# Patient Record
Sex: MALE | Race: WHITE | Employment: OTHER | URBAN - METROPOLITAN AREA
[De-identification: names, ages, dates, MRNs, and addresses within clinical notes are randomized per-mention and may not be internally consistent; named-entity substitution may affect disease eponyms.]

---

## 2017-12-06 ENCOUNTER — HOSPITAL ENCOUNTER (OUTPATIENT)
Age: 62
Discharge: HOME | End: 2017-12-06
Payer: COMMERCIAL

## 2017-12-06 LAB
ALBUMIN SERPL-MCNC: 4.1 G/DL (ref 3.5–5.2)
ALBUMIN/GLOBULIN RATIO: NORMAL (ref 1–2.5)
ALP BLD-CCNC: 63 U/L (ref 40–129)
ALT SERPL-CCNC: 27 U/L (ref 5–41)
ANION GAP SERPL CALCULATED.3IONS-SCNC: 11 MMOL/L (ref 9–17)
AST SERPL-CCNC: 34 U/L
BILIRUB SERPL-MCNC: 0.38 MG/DL (ref 0.3–1.2)
BILIRUBIN DIRECT: 0.11 MG/DL
BILIRUBIN, INDIRECT: 0.27 MG/DL (ref 0–1)
BUN BLDV-MCNC: 39 MG/DL (ref 8–23)
BUN/CREAT BLD: ABNORMAL (ref 9–20)
CALCIUM SERPL-MCNC: 9.1 MG/DL (ref 8.6–10.4)
CHLORIDE BLD-SCNC: 101 MMOL/L (ref 98–107)
CHOLESTEROL/HDL RATIO: 3.6
CHOLESTEROL: 192 MG/DL
CO2: 28 MMOL/L (ref 20–31)
CREAT SERPL-MCNC: 1.27 MG/DL (ref 0.7–1.2)
GFR AFRICAN AMERICAN: >60 ML/MIN
GFR NON-AFRICAN AMERICAN: 57 ML/MIN
GFR SERPL CREATININE-BSD FRML MDRD: ABNORMAL ML/MIN/{1.73_M2}
GFR SERPL CREATININE-BSD FRML MDRD: ABNORMAL ML/MIN/{1.73_M2}
GLOBULIN: NORMAL G/DL (ref 1.5–3.8)
GLUCOSE BLD-MCNC: 102 MG/DL (ref 70–99)
HCT VFR BLD CALC: 42.3 % (ref 41–53)
HDLC SERPL-MCNC: 53 MG/DL
HEMOGLOBIN: 14 G/DL (ref 13.5–17.5)
LDL CHOLESTEROL: 126 MG/DL (ref 0–130)
MCH RBC QN AUTO: 30.3 PG (ref 26–34)
MCHC RBC AUTO-ENTMCNC: 33.2 G/DL (ref 31–37)
MCV RBC AUTO: 91.3 FL (ref 80–100)
PDW BLD-RTO: 14.9 % (ref 11.5–14.9)
PLATELET # BLD: 180 K/UL (ref 150–450)
PMV BLD AUTO: 9.1 FL (ref 6–12)
POTASSIUM SERPL-SCNC: 4.6 MMOL/L (ref 3.7–5.3)
PROSTATE SPECIFIC ANTIGEN: 0.48 UG/L
RBC # BLD: 4.63 M/UL (ref 4.5–5.9)
SODIUM BLD-SCNC: 140 MMOL/L (ref 135–144)
TOTAL PROTEIN: 6.8 G/DL (ref 6.4–8.3)
TRIGL SERPL-MCNC: 67 MG/DL
URIC ACID: 5.1 MG/DL (ref 3.4–7)
VLDLC SERPL CALC-MCNC: NORMAL MG/DL (ref 1–30)
WBC # BLD: 5.5 K/UL (ref 3.5–11)

## 2017-12-06 PROCEDURE — 85027 COMPLETE CBC AUTOMATED: CPT

## 2017-12-06 PROCEDURE — 84550 ASSAY OF BLOOD/URIC ACID: CPT

## 2017-12-06 PROCEDURE — G0103 PSA SCREENING: HCPCS

## 2017-12-06 PROCEDURE — 80061 LIPID PANEL: CPT

## 2017-12-06 PROCEDURE — 80076 HEPATIC FUNCTION PANEL: CPT

## 2017-12-06 PROCEDURE — 36415 COLL VENOUS BLD VENIPUNCTURE: CPT

## 2017-12-06 PROCEDURE — 80048 BASIC METABOLIC PNL TOTAL CA: CPT

## 2018-01-21 ENCOUNTER — OFFICE VISIT (OUTPATIENT)
Dept: NEUROLOGY | Age: 63
End: 2018-01-21
Payer: COMMERCIAL

## 2018-01-21 DIAGNOSIS — G60.9 PERIPHERAL NEUROPATHY, IDIOPATHIC: Primary | ICD-10-CM

## 2018-01-21 PROCEDURE — 95909 NRV CNDJ TST 5-6 STUDIES: CPT | Performed by: PSYCHIATRY & NEUROLOGY

## 2018-01-21 PROCEDURE — 95886 MUSC TEST DONE W/N TEST COMP: CPT | Performed by: PSYCHIATRY & NEUROLOGY

## 2018-01-21 NOTE — PROGRESS NOTES
[facility] by [address] [place] [address], [address] [place] [address]    [phone] phone  [phone] fax          Name: Douglas Tellez Patient ID: [id number]   Gender: Male Date of Exam: 1/21/2018   Age: 57 y YOB: 1955   Height: 5'11\" Weight: 26 Lbs   Referring Physician: Kirti Rodriguez MD   Examining Physician: Chyna Love MD        Patient History:   The patient has been complaining of numbness in bilateral feet, radiating up to shins. bulging disc in lumbar spine. having the symptoms since 2011, gradually getting worse. Neurological examination is normal    Bilateral peroneal motor studies showed normal DML, low CMAP amplitudes and low normal motor nerve conduction velocities with prolonged F-wave latencies. Bilateral tibial motor studies showed normal DMLs, low CMAP amplitudes and borderline normal motor nerve conduction velocities. Right sural antidromic sensory response showed normal peak latency, amplitude and sensory nerve conduction velocity. 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   Active denervation was seen in right lower paraspinal muscles        Motor Nerve Conduction:    Nerve and Site Latency Amplitude Segment Latency  Difference Distance Conduction  Velocity            Peroneal.R         Ankle 5.8 ms 1.9 mV Extensor digitorum brevis-Ankle 5.8 ms 80 mm  m/s   Fibula (head) 14.2 ms 1.6 mV Ankle-Fibula (head) 8.4 ms 340 mm 40 m/s   Popliteal fossa 16.4 ms 1.3 mV Fibula (head)-Popliteal fossa 2.2 ms 90 mm 41 m/s   Tibial.R         Ankle 6.7 ms 1.2 mV Abductor hallucis-Ankle 6.7 ms 80 mm  m/s   Popliteal fossa 15.6 ms 1.4 mV Ankle-Popliteal fossa 8.9 ms 405 mm 46 m/s   Peroneal.L         Ankle 5.5 ms 3.3 mV Extensor digitorum brevis-Ankle

## 2018-02-14 ENCOUNTER — TELEPHONE (OUTPATIENT)
Dept: NEUROLOGY | Age: 63
End: 2018-02-14

## 2018-04-11 ENCOUNTER — OFFICE VISIT (OUTPATIENT)
Dept: PODIATRY | Age: 63
End: 2018-04-11
Payer: COMMERCIAL

## 2018-04-11 VITALS
WEIGHT: 230 LBS | DIASTOLIC BLOOD PRESSURE: 70 MMHG | HEIGHT: 72 IN | SYSTOLIC BLOOD PRESSURE: 121 MMHG | BODY MASS INDEX: 31.15 KG/M2 | HEART RATE: 60 BPM

## 2018-04-11 DIAGNOSIS — M79.675 PAIN IN TOE OF LEFT FOOT: ICD-10-CM

## 2018-04-11 DIAGNOSIS — L97.522 SKIN ULCER OF LEFT GREAT TOE WITH FAT LAYER EXPOSED (HCC): Primary | ICD-10-CM

## 2018-04-11 PROCEDURE — 1036F TOBACCO NON-USER: CPT | Performed by: PODIATRIST

## 2018-04-11 PROCEDURE — 99203 OFFICE O/P NEW LOW 30 MIN: CPT | Performed by: PODIATRIST

## 2018-04-11 PROCEDURE — 3017F COLORECTAL CA SCREEN DOC REV: CPT | Performed by: PODIATRIST

## 2018-04-11 PROCEDURE — 11042 DBRDMT SUBQ TIS 1ST 20SQCM/<: CPT | Performed by: PODIATRIST

## 2018-04-11 PROCEDURE — G8427 DOCREV CUR MEDS BY ELIG CLIN: HCPCS | Performed by: PODIATRIST

## 2018-04-11 PROCEDURE — G8417 CALC BMI ABV UP PARAM F/U: HCPCS | Performed by: PODIATRIST

## 2018-04-11 RX ORDER — LISINOPRIL 10 MG/1
10 TABLET ORAL DAILY
COMMUNITY

## 2018-04-11 RX ORDER — PREGABALIN 300 MG/1
300 CAPSULE ORAL 2 TIMES DAILY
COMMUNITY

## 2018-04-11 RX ORDER — MIRTAZAPINE 15 MG/1
15 TABLET, FILM COATED ORAL NIGHTLY
COMMUNITY
End: 2018-05-11

## 2018-04-11 RX ORDER — ALLOPURINOL 300 MG/1
300 TABLET ORAL DAILY
COMMUNITY

## 2018-04-11 RX ORDER — CEPHALEXIN 500 MG/1
500 CAPSULE ORAL 4 TIMES DAILY
Qty: 40 CAPSULE | Refills: 0 | Status: SHIPPED | OUTPATIENT
Start: 2018-04-11 | End: 2018-04-21

## 2018-04-11 RX ORDER — TERBINAFINE HYDROCHLORIDE 250 MG/1
250 TABLET ORAL DAILY
COMMUNITY
End: 2018-07-02

## 2018-04-11 RX ORDER — DIAZEPAM 2 MG/1
2 TABLET ORAL EVERY 6 HOURS PRN
COMMUNITY
End: 2019-09-27 | Stop reason: ALTCHOICE

## 2018-04-11 RX ORDER — BISOPROLOL FUMARATE AND HYDROCHLOROTHIAZIDE 10; 6.25 MG/1; MG/1
1 TABLET ORAL DAILY
COMMUNITY

## 2018-04-11 RX ORDER — OXYCODONE HYDROCHLORIDE 10 MG/1
10 TABLET ORAL EVERY 6 HOURS PRN
COMMUNITY

## 2018-04-11 RX ORDER — DULOXETIN HYDROCHLORIDE 60 MG/1
60 CAPSULE, DELAYED RELEASE ORAL 2 TIMES DAILY
COMMUNITY
End: 2019-09-27 | Stop reason: ALTCHOICE

## 2018-04-11 RX ORDER — POLYETHYLENE GLYCOL 3350 17 G/17G
17 POWDER, FOR SOLUTION ORAL DAILY PRN
COMMUNITY
End: 2019-11-22 | Stop reason: ALTCHOICE

## 2018-04-11 ASSESSMENT — ENCOUNTER SYMPTOMS
SHORTNESS OF BREATH: 0
NAUSEA: 0
DIARRHEA: 0
BACK PAIN: 1
COLOR CHANGE: 0

## 2018-04-17 ENCOUNTER — OFFICE VISIT (OUTPATIENT)
Dept: PODIATRY | Age: 63
End: 2018-04-17
Payer: COMMERCIAL

## 2018-04-17 VITALS
HEIGHT: 72 IN | DIASTOLIC BLOOD PRESSURE: 70 MMHG | HEART RATE: 60 BPM | SYSTOLIC BLOOD PRESSURE: 112 MMHG | BODY MASS INDEX: 31.15 KG/M2 | WEIGHT: 230 LBS

## 2018-04-17 DIAGNOSIS — M79.675 PAIN IN TOE OF LEFT FOOT: ICD-10-CM

## 2018-04-17 DIAGNOSIS — L97.522 SKIN ULCER OF LEFT GREAT TOE WITH FAT LAYER EXPOSED (HCC): Primary | ICD-10-CM

## 2018-04-17 PROCEDURE — 11042 DBRDMT SUBQ TIS 1ST 20SQCM/<: CPT | Performed by: PODIATRIST

## 2018-04-22 ASSESSMENT — ENCOUNTER SYMPTOMS
BACK PAIN: 1
COLOR CHANGE: 0
DIARRHEA: 0
SHORTNESS OF BREATH: 0
NAUSEA: 0

## 2018-04-27 ENCOUNTER — OFFICE VISIT (OUTPATIENT)
Dept: PODIATRY | Age: 63
End: 2018-04-27
Payer: COMMERCIAL

## 2018-04-27 VITALS
HEIGHT: 72 IN | HEART RATE: 60 BPM | WEIGHT: 230 LBS | SYSTOLIC BLOOD PRESSURE: 114 MMHG | BODY MASS INDEX: 31.15 KG/M2 | DIASTOLIC BLOOD PRESSURE: 75 MMHG

## 2018-04-27 DIAGNOSIS — L97.522 SKIN ULCER OF LEFT GREAT TOE WITH FAT LAYER EXPOSED (HCC): Primary | ICD-10-CM

## 2018-04-27 DIAGNOSIS — M79.675 PAIN IN TOE OF LEFT FOOT: ICD-10-CM

## 2018-04-27 PROCEDURE — 11042 DBRDMT SUBQ TIS 1ST 20SQCM/<: CPT | Performed by: PODIATRIST

## 2018-04-30 ASSESSMENT — ENCOUNTER SYMPTOMS
NAUSEA: 0
DIARRHEA: 0
SHORTNESS OF BREATH: 0
BACK PAIN: 1
COLOR CHANGE: 0

## 2018-05-04 ENCOUNTER — OFFICE VISIT (OUTPATIENT)
Dept: PODIATRY | Age: 63
End: 2018-05-04
Payer: COMMERCIAL

## 2018-05-04 VITALS
HEART RATE: 58 BPM | BODY MASS INDEX: 31.15 KG/M2 | HEIGHT: 72 IN | DIASTOLIC BLOOD PRESSURE: 69 MMHG | SYSTOLIC BLOOD PRESSURE: 112 MMHG | WEIGHT: 230 LBS

## 2018-05-04 DIAGNOSIS — L97.522 SKIN ULCER OF LEFT GREAT TOE WITH FAT LAYER EXPOSED (HCC): Primary | ICD-10-CM

## 2018-05-04 DIAGNOSIS — M79.675 PAIN IN TOE OF LEFT FOOT: ICD-10-CM

## 2018-05-04 PROCEDURE — 11042 DBRDMT SUBQ TIS 1ST 20SQCM/<: CPT | Performed by: PODIATRIST

## 2018-05-08 ASSESSMENT — ENCOUNTER SYMPTOMS
SHORTNESS OF BREATH: 0
DIARRHEA: 0
COLOR CHANGE: 0
BACK PAIN: 1
NAUSEA: 0

## 2018-05-11 ENCOUNTER — OFFICE VISIT (OUTPATIENT)
Dept: PODIATRY | Age: 63
End: 2018-05-11
Payer: COMMERCIAL

## 2018-05-11 VITALS
WEIGHT: 230 LBS | HEART RATE: 66 BPM | BODY MASS INDEX: 31.15 KG/M2 | SYSTOLIC BLOOD PRESSURE: 109 MMHG | HEIGHT: 72 IN | DIASTOLIC BLOOD PRESSURE: 64 MMHG

## 2018-05-11 DIAGNOSIS — L97.522 SKIN ULCER OF LEFT GREAT TOE WITH FAT LAYER EXPOSED (HCC): ICD-10-CM

## 2018-05-11 DIAGNOSIS — M79.675 PAIN IN TOE OF LEFT FOOT: ICD-10-CM

## 2018-05-11 PROCEDURE — 99999 PR OFFICE/OUTPT VISIT,PROCEDURE ONLY: CPT | Performed by: PODIATRIST

## 2018-05-11 PROCEDURE — 11042 DBRDMT SUBQ TIS 1ST 20SQCM/<: CPT | Performed by: PODIATRIST

## 2018-05-14 ENCOUNTER — TELEPHONE (OUTPATIENT)
Dept: PODIATRY | Age: 63
End: 2018-05-14

## 2018-05-15 ASSESSMENT — ENCOUNTER SYMPTOMS
NAUSEA: 0
COLOR CHANGE: 0
SHORTNESS OF BREATH: 0
BACK PAIN: 1
DIARRHEA: 0

## 2018-05-18 ENCOUNTER — OFFICE VISIT (OUTPATIENT)
Dept: PODIATRY | Age: 63
End: 2018-05-18
Payer: COMMERCIAL

## 2018-05-18 VITALS
WEIGHT: 230 LBS | HEART RATE: 58 BPM | DIASTOLIC BLOOD PRESSURE: 73 MMHG | BODY MASS INDEX: 31.15 KG/M2 | HEIGHT: 72 IN | SYSTOLIC BLOOD PRESSURE: 127 MMHG

## 2018-05-18 DIAGNOSIS — M79.675 PAIN IN TOE OF LEFT FOOT: ICD-10-CM

## 2018-05-18 DIAGNOSIS — L97.522 SKIN ULCER OF LEFT GREAT TOE WITH FAT LAYER EXPOSED (HCC): Primary | ICD-10-CM

## 2018-05-18 PROCEDURE — 11042 DBRDMT SUBQ TIS 1ST 20SQCM/<: CPT | Performed by: PODIATRIST

## 2018-05-21 ASSESSMENT — ENCOUNTER SYMPTOMS
COLOR CHANGE: 0
DIARRHEA: 0
SHORTNESS OF BREATH: 0
NAUSEA: 0
BACK PAIN: 1

## 2018-06-01 ENCOUNTER — HOSPITAL ENCOUNTER (OUTPATIENT)
Age: 63
Setting detail: SPECIMEN
Discharge: HOME | End: 2018-06-01
Payer: COMMERCIAL

## 2018-06-01 ENCOUNTER — OFFICE VISIT (OUTPATIENT)
Dept: PODIATRY | Age: 63
End: 2018-06-01
Payer: COMMERCIAL

## 2018-06-01 VITALS
SYSTOLIC BLOOD PRESSURE: 108 MMHG | DIASTOLIC BLOOD PRESSURE: 64 MMHG | HEART RATE: 79 BPM | BODY MASS INDEX: 31.15 KG/M2 | HEIGHT: 72 IN | WEIGHT: 230 LBS

## 2018-06-01 DIAGNOSIS — L97.522 SKIN ULCER OF LEFT GREAT TOE WITH FAT LAYER EXPOSED (HCC): Primary | ICD-10-CM

## 2018-06-01 DIAGNOSIS — M79.675 PAIN IN TOE OF LEFT FOOT: ICD-10-CM

## 2018-06-01 DIAGNOSIS — M79.674 PAIN IN TOE OF RIGHT FOOT: ICD-10-CM

## 2018-06-01 DIAGNOSIS — L97.512 SKIN ULCER OF SECOND TOE OF RIGHT FOOT WITH FAT LAYER EXPOSED (HCC): ICD-10-CM

## 2018-06-01 PROCEDURE — 1036F TOBACCO NON-USER: CPT | Performed by: PODIATRIST

## 2018-06-01 PROCEDURE — 3017F COLORECTAL CA SCREEN DOC REV: CPT | Performed by: PODIATRIST

## 2018-06-01 PROCEDURE — G8427 DOCREV CUR MEDS BY ELIG CLIN: HCPCS | Performed by: PODIATRIST

## 2018-06-01 PROCEDURE — 11042 DBRDMT SUBQ TIS 1ST 20SQCM/<: CPT | Performed by: PODIATRIST

## 2018-06-01 PROCEDURE — 11100 PR BIOPSY OF SKIN LESION: CPT | Performed by: PODIATRIST

## 2018-06-01 PROCEDURE — G8417 CALC BMI ABV UP PARAM F/U: HCPCS | Performed by: PODIATRIST

## 2018-06-01 PROCEDURE — 99213 OFFICE O/P EST LOW 20 MIN: CPT | Performed by: PODIATRIST

## 2018-06-01 RX ORDER — CEPHALEXIN 500 MG/1
500 CAPSULE ORAL 4 TIMES DAILY
Qty: 40 CAPSULE | Refills: 0 | Status: SHIPPED | OUTPATIENT
Start: 2018-06-01 | End: 2018-06-11

## 2018-06-01 RX ORDER — BUPIVACAINE HYDROCHLORIDE 5 MG/ML
1 INJECTION, SOLUTION PERINEURAL ONCE
Status: COMPLETED | OUTPATIENT
Start: 2018-06-01 | End: 2018-06-01

## 2018-06-01 RX ADMIN — BUPIVACAINE HYDROCHLORIDE 5 MG: 5 INJECTION, SOLUTION PERINEURAL at 12:02

## 2018-06-04 ASSESSMENT — ENCOUNTER SYMPTOMS
NAUSEA: 0
DIARRHEA: 0
COLOR CHANGE: 0
SHORTNESS OF BREATH: 0
BACK PAIN: 1

## 2018-06-05 LAB — DERMATOLOGY PATHOLOGY REPORT: NORMAL

## 2018-06-08 ENCOUNTER — OFFICE VISIT (OUTPATIENT)
Dept: PODIATRY | Age: 63
End: 2018-06-08
Payer: COMMERCIAL

## 2018-06-08 VITALS
WEIGHT: 230 LBS | SYSTOLIC BLOOD PRESSURE: 112 MMHG | DIASTOLIC BLOOD PRESSURE: 73 MMHG | BODY MASS INDEX: 31.15 KG/M2 | HEART RATE: 54 BPM | HEIGHT: 72 IN

## 2018-06-08 DIAGNOSIS — L97.512 SKIN ULCER OF SECOND TOE OF RIGHT FOOT WITH FAT LAYER EXPOSED (HCC): ICD-10-CM

## 2018-06-08 DIAGNOSIS — M79.674 PAIN IN TOE OF RIGHT FOOT: ICD-10-CM

## 2018-06-08 DIAGNOSIS — M79.675 PAIN IN TOE OF LEFT FOOT: ICD-10-CM

## 2018-06-08 DIAGNOSIS — L97.522 SKIN ULCER OF LEFT GREAT TOE WITH FAT LAYER EXPOSED (HCC): Primary | ICD-10-CM

## 2018-06-08 PROCEDURE — 11042 DBRDMT SUBQ TIS 1ST 20SQCM/<: CPT | Performed by: PODIATRIST

## 2018-06-08 PROCEDURE — 99999 PR OFFICE/OUTPT VISIT,PROCEDURE ONLY: CPT | Performed by: PODIATRIST

## 2018-06-10 ASSESSMENT — ENCOUNTER SYMPTOMS
NAUSEA: 0
SHORTNESS OF BREATH: 0
DIARRHEA: 0
COLOR CHANGE: 0
BACK PAIN: 1

## 2018-06-14 ENCOUNTER — OFFICE VISIT (OUTPATIENT)
Dept: PODIATRY | Age: 63
End: 2018-06-14
Payer: COMMERCIAL

## 2018-06-14 VITALS
SYSTOLIC BLOOD PRESSURE: 117 MMHG | HEART RATE: 72 BPM | BODY MASS INDEX: 31.15 KG/M2 | DIASTOLIC BLOOD PRESSURE: 67 MMHG | HEIGHT: 72 IN | WEIGHT: 230 LBS

## 2018-06-14 DIAGNOSIS — M79.675 PAIN IN TOE OF LEFT FOOT: ICD-10-CM

## 2018-06-14 DIAGNOSIS — L08.9 SOFT TISSUE INFECTION OF FOOT: ICD-10-CM

## 2018-06-14 DIAGNOSIS — M79.674 PAIN IN TOE OF RIGHT FOOT: ICD-10-CM

## 2018-06-14 DIAGNOSIS — L97.512 SKIN ULCER OF SECOND TOE OF RIGHT FOOT WITH FAT LAYER EXPOSED (HCC): ICD-10-CM

## 2018-06-14 DIAGNOSIS — M20.42 HAMMER TOES OF BOTH FEET: ICD-10-CM

## 2018-06-14 DIAGNOSIS — M20.41 HAMMER TOES OF BOTH FEET: ICD-10-CM

## 2018-06-14 DIAGNOSIS — L97.522 SKIN ULCER OF LEFT GREAT TOE WITH FAT LAYER EXPOSED (HCC): Primary | ICD-10-CM

## 2018-06-14 PROCEDURE — 99999 PR OFFICE/OUTPT VISIT,PROCEDURE ONLY: CPT | Performed by: PODIATRIST

## 2018-06-14 PROCEDURE — 28232 INCISION OF TOE TENDON: CPT | Performed by: PODIATRIST

## 2018-06-14 RX ORDER — BUPIVACAINE HYDROCHLORIDE 5 MG/ML
1 INJECTION, SOLUTION PERINEURAL ONCE
Status: COMPLETED | OUTPATIENT
Start: 2018-06-14 | End: 2018-06-14

## 2018-06-14 RX ORDER — CEPHALEXIN 500 MG/1
500 CAPSULE ORAL 4 TIMES DAILY
Qty: 56 CAPSULE | Refills: 0 | Status: SHIPPED | OUTPATIENT
Start: 2018-06-14 | End: 2018-07-02 | Stop reason: ALTCHOICE

## 2018-06-14 RX ADMIN — BUPIVACAINE HYDROCHLORIDE 5 MG: 5 INJECTION, SOLUTION PERINEURAL at 11:46

## 2018-06-14 ASSESSMENT — ENCOUNTER SYMPTOMS
DIARRHEA: 0
NAUSEA: 0
SHORTNESS OF BREATH: 0
COLOR CHANGE: 0
BACK PAIN: 1

## 2018-06-20 ENCOUNTER — OFFICE VISIT (OUTPATIENT)
Dept: PODIATRY | Age: 63
End: 2018-06-20
Payer: COMMERCIAL

## 2018-06-20 VITALS
SYSTOLIC BLOOD PRESSURE: 116 MMHG | HEIGHT: 72 IN | WEIGHT: 230 LBS | HEART RATE: 71 BPM | DIASTOLIC BLOOD PRESSURE: 65 MMHG | BODY MASS INDEX: 31.15 KG/M2

## 2018-06-20 DIAGNOSIS — M20.42 HAMMER TOES OF BOTH FEET: ICD-10-CM

## 2018-06-20 DIAGNOSIS — L97.512 SKIN ULCER OF SECOND TOE OF RIGHT FOOT WITH FAT LAYER EXPOSED (HCC): ICD-10-CM

## 2018-06-20 DIAGNOSIS — L97.522 SKIN ULCER OF LEFT GREAT TOE WITH FAT LAYER EXPOSED (HCC): ICD-10-CM

## 2018-06-20 DIAGNOSIS — L97.522 SKIN ULCER OF LEFT GREAT TOE WITH FAT LAYER EXPOSED (HCC): Primary | ICD-10-CM

## 2018-06-20 DIAGNOSIS — M20.41 HAMMER TOES OF BOTH FEET: ICD-10-CM

## 2018-06-20 DIAGNOSIS — R20.0 LOSS OF SENSATION: ICD-10-CM

## 2018-06-20 DIAGNOSIS — M79.675 PAIN IN TOE OF LEFT FOOT: ICD-10-CM

## 2018-06-20 DIAGNOSIS — M79.674 PAIN IN TOE OF RIGHT FOOT: ICD-10-CM

## 2018-06-20 PROCEDURE — 3017F COLORECTAL CA SCREEN DOC REV: CPT | Performed by: PODIATRIST

## 2018-06-20 PROCEDURE — 11042 DBRDMT SUBQ TIS 1ST 20SQCM/<: CPT | Performed by: PODIATRIST

## 2018-06-20 PROCEDURE — G8427 DOCREV CUR MEDS BY ELIG CLIN: HCPCS | Performed by: PODIATRIST

## 2018-06-20 PROCEDURE — 99024 POSTOP FOLLOW-UP VISIT: CPT | Performed by: PODIATRIST

## 2018-06-20 PROCEDURE — 1036F TOBACCO NON-USER: CPT | Performed by: PODIATRIST

## 2018-06-20 PROCEDURE — G8417 CALC BMI ABV UP PARAM F/U: HCPCS | Performed by: PODIATRIST

## 2018-06-20 ASSESSMENT — ENCOUNTER SYMPTOMS
BACK PAIN: 1
NAUSEA: 0
SHORTNESS OF BREATH: 0
DIARRHEA: 0
COLOR CHANGE: 0

## 2018-07-02 ENCOUNTER — OFFICE VISIT (OUTPATIENT)
Dept: PODIATRY | Age: 63
End: 2018-07-02
Payer: MEDICARE

## 2018-07-02 VITALS
HEART RATE: 55 BPM | HEIGHT: 72 IN | BODY MASS INDEX: 31.15 KG/M2 | DIASTOLIC BLOOD PRESSURE: 65 MMHG | SYSTOLIC BLOOD PRESSURE: 110 MMHG | WEIGHT: 230 LBS

## 2018-07-02 DIAGNOSIS — M79.675 PAIN IN TOE OF LEFT FOOT: ICD-10-CM

## 2018-07-02 DIAGNOSIS — M20.41 HAMMER TOES OF BOTH FEET: ICD-10-CM

## 2018-07-02 DIAGNOSIS — M20.42 HAMMER TOES OF BOTH FEET: ICD-10-CM

## 2018-07-02 DIAGNOSIS — M79.674 PAIN IN TOE OF RIGHT FOOT: ICD-10-CM

## 2018-07-02 DIAGNOSIS — L97.512 SKIN ULCER OF SECOND TOE OF RIGHT FOOT WITH FAT LAYER EXPOSED (HCC): ICD-10-CM

## 2018-07-02 DIAGNOSIS — L97.522 SKIN ULCER OF LEFT GREAT TOE WITH FAT LAYER EXPOSED (HCC): Primary | ICD-10-CM

## 2018-07-02 PROCEDURE — 11042 DBRDMT SUBQ TIS 1ST 20SQCM/<: CPT | Performed by: PODIATRIST

## 2018-07-02 PROCEDURE — 99999 PR OFFICE/OUTPT VISIT,PROCEDURE ONLY: CPT | Performed by: PODIATRIST

## 2018-07-02 ASSESSMENT — ENCOUNTER SYMPTOMS
DIARRHEA: 0
BACK PAIN: 1
SHORTNESS OF BREATH: 0
COLOR CHANGE: 0
NAUSEA: 0

## 2018-07-02 NOTE — PROGRESS NOTES
informed that I am recommending tenotomy to the remaining toes of the bilateral feet. Patient agreed and will schedule this with the front office. Abx :No Cultures :were notobtained. Return in about 1 week (around 7/9/2018) for Wound Care.    7/2/2018        Arnaldo Fisher DPM

## 2018-07-09 ENCOUNTER — HOSPITAL ENCOUNTER (OUTPATIENT)
Dept: PREADMISSION TESTING | Age: 63
Discharge: HOME | End: 2018-07-13
Payer: COMMERCIAL

## 2018-07-09 VITALS
RESPIRATION RATE: 20 BRPM | TEMPERATURE: 98 F | BODY MASS INDEX: 31.83 KG/M2 | DIASTOLIC BLOOD PRESSURE: 85 MMHG | HEART RATE: 50 BPM | SYSTOLIC BLOOD PRESSURE: 118 MMHG | HEIGHT: 72 IN | WEIGHT: 235 LBS | OXYGEN SATURATION: 99 %

## 2018-07-09 LAB
ABSOLUTE EOS #: 0.2 K/UL (ref 0–0.4)
ABSOLUTE IMMATURE GRANULOCYTE: ABNORMAL K/UL (ref 0–0.3)
ABSOLUTE LYMPH #: 1.5 K/UL (ref 1–4.8)
ABSOLUTE MONO #: 0.5 K/UL (ref 0.1–1.3)
ANION GAP SERPL CALCULATED.3IONS-SCNC: 10 MMOL/L (ref 9–17)
BASOPHILS # BLD: 1 % (ref 0–2)
BASOPHILS ABSOLUTE: 0 K/UL (ref 0–0.2)
BUN BLDV-MCNC: 27 MG/DL (ref 8–23)
BUN/CREAT BLD: ABNORMAL (ref 9–20)
CALCIUM SERPL-MCNC: 9.7 MG/DL (ref 8.6–10.4)
CHLORIDE BLD-SCNC: 100 MMOL/L (ref 98–107)
CO2: 31 MMOL/L (ref 20–31)
CREAT SERPL-MCNC: 1.21 MG/DL (ref 0.7–1.2)
DIFFERENTIAL TYPE: ABNORMAL
EKG ATRIAL RATE: 52 BPM
EKG P AXIS: 46 DEGREES
EKG P-R INTERVAL: 192 MS
EKG Q-T INTERVAL: 404 MS
EKG QRS DURATION: 92 MS
EKG QTC CALCULATION (BAZETT): 375 MS
EKG R AXIS: 55 DEGREES
EKG T AXIS: 22 DEGREES
EKG VENTRICULAR RATE: 52 BPM
EOSINOPHILS RELATIVE PERCENT: 3 % (ref 0–4)
GFR AFRICAN AMERICAN: >60 ML/MIN
GFR NON-AFRICAN AMERICAN: >60 ML/MIN
GFR SERPL CREATININE-BSD FRML MDRD: ABNORMAL ML/MIN/{1.73_M2}
GFR SERPL CREATININE-BSD FRML MDRD: ABNORMAL ML/MIN/{1.73_M2}
GLUCOSE BLD-MCNC: 100 MG/DL (ref 70–99)
HCT VFR BLD CALC: 44.2 % (ref 41–53)
HEMOGLOBIN: 15 G/DL (ref 13.5–17.5)
IMMATURE GRANULOCYTES: ABNORMAL %
LYMPHOCYTES # BLD: 28 % (ref 24–44)
MCH RBC QN AUTO: 31.4 PG (ref 26–34)
MCHC RBC AUTO-ENTMCNC: 33.9 G/DL (ref 31–37)
MCV RBC AUTO: 92.7 FL (ref 80–100)
MONOCYTES # BLD: 9 % (ref 1–7)
NRBC AUTOMATED: ABNORMAL PER 100 WBC
PDW BLD-RTO: 13.6 % (ref 11.5–14.9)
PLATELET # BLD: 175 K/UL (ref 150–450)
PLATELET ESTIMATE: ABNORMAL
PMV BLD AUTO: 9.4 FL (ref 6–12)
POTASSIUM SERPL-SCNC: 5 MMOL/L (ref 3.7–5.3)
RBC # BLD: 4.77 M/UL (ref 4.5–5.9)
RBC # BLD: ABNORMAL 10*6/UL
SEG NEUTROPHILS: 59 % (ref 36–66)
SEGMENTED NEUTROPHILS ABSOLUTE COUNT: 3.4 K/UL (ref 1.3–9.1)
SODIUM BLD-SCNC: 141 MMOL/L (ref 135–144)
WBC # BLD: 5.6 K/UL (ref 3.5–11)
WBC # BLD: ABNORMAL 10*3/UL

## 2018-07-09 PROCEDURE — 93005 ELECTROCARDIOGRAM TRACING: CPT

## 2018-07-09 PROCEDURE — 36415 COLL VENOUS BLD VENIPUNCTURE: CPT

## 2018-07-09 PROCEDURE — 85025 COMPLETE CBC W/AUTO DIFF WBC: CPT

## 2018-07-09 PROCEDURE — 80048 BASIC METABOLIC PNL TOTAL CA: CPT

## 2018-07-09 NOTE — H&P
HISTORY and [facility] [address]       NAME:  Tri Gomez  MRN: [id number]   YOB: 1955   Date: 7/9/2018   Age: 57 y.o. Gender: male       COMPLAINT AND PRESENT HISTORY:     Tri Gomez is 57 y.o.,  male for [address] TOE & LEFT 3RD 4TH 5TH TOE-Bilateral.  He has a history of pain and left and right foot, skin ulcers of the great toe with fat layer exposed, and skin ulcers of the second toe of right foot. The symptoms started years ago, and seemed to worsen recently around April of this year. She complains of associated neuropathy to his toes that could be secondary to his low back pain as well. Patient reports having his tendons cut in the past to help release the pain from his toes curling in tightly. No recent falls or trauma. No fever or chills. PAST MEDICAL HISTORY  Pt denies any other symptoms. Past Medical History:   Diagnosis Date    Depression     Hypertension     Kidney disease     Osteoarthritis     PONV (postoperative nausea and vomiting)     Wears glasses      Pt denies any history of Diabetes mellitus type 2,  stroke, heart disease, COPD, Asthma, GERD, HLD, Cancer, Seizures,Thyroid disease,  Hepatitis, TB, or Substance abuse. SURGICAL HISTORY       Past Surgical History:   Procedure Laterality Date    ANKLE SURGERY Left     CYST REMOVAL      Neck     KNEE ARTHROSCOPY Bilateral     several    SHOULDER SURGERY Bilateral     TOTAL KNEE ARTHROPLASTY Bilateral        FAMILY HISTORY     History reviewed. No pertinent family history.     SOCIAL HISTORY       Social History     Social History    Marital status:      Spouse name: N/A    Number of children: N/A    Years of education: N/A     Social History Main Topics    Smoking status: Never Smoker    Smokeless tobacco: Never Used    Alcohol use Yes      Comment: rare    Drug use: No    Sexual activity: Not Asked     Other Topics Concern    None Social History Narrative    None           REVIEW OF SYSTEMS      Allergies   Allergen Reactions    Asa [Aspirin] Hives    Nsaids      Hard on kidneys    Reglan [Metoclopramide] Itching       Current Outpatient Prescriptions on File Prior to Encounter   Medication Sig Dispense Refill    allopurinol (ZYLOPRIM) 300 MG tablet Take 300 mg by mouth daily      bisoprolol-hydrochlorothiazide (ZIAC) 10-6.25 MG per tablet Take 1 tablet by mouth daily      lisinopril (PRINIVIL;ZESTRIL) 10 MG tablet Take 10 mg by mouth daily      DULoxetine (CYMBALTA) 60 MG extended release capsule Take 60 mg by mouth 2 times daily       pregabalin (LYRICA) 300 MG capsule Take 300 mg by mouth 2 times daily. Chris Rogers Morphine Sulfate ER 15 MG TBEA Take by mouth daily. Chris Ken oxyCODONE HCl (OXY-IR) 10 MG immediate release tablet Take 10 mg by mouth every 6 hours as needed for Pain. Chris Rogers diazepam (VALIUM) 2 MG tablet Take 2 mg by mouth every 6 hours as needed for Anxiety. .      polyethylene glycol (GLYCOLAX) powder Take 17 g by mouth daily       No current facility-administered medications on file prior to encounter. General health:  Fairly good. No fever or chills. Skin:  No itching, redness or rash. HEENT:  No headache, epistaxis or sore throat. Neck:  No pain, stiffness or masses. Cardiovascular/Respiratory system:  No chest pain, palpitation or shortness of breath. Gastrointestinal tract: No abdominal pain, nausea, vomiting, diarrhea or constipation. Genitourinary:  No burning on micturition. No hesitancy, urgency, frequency or discoloration of urine. Locomotor:  See HPI. Neuropsychiatric:  No referable complaints.                       GENERAL PHYSICAL EXAM:     Vitals: /85   Pulse 50   Temp 98 °F (36.7 °C)   Resp 20   Ht 6' (1.829 m)   Wt 235 lb (106.6 kg)   SpO2 99%   BMI 31.87 kg/m²  Body mass index is 31.87 kg/m². GENERAL APPEARANCE:   Heather Alicia is 57 y.o.,  male, mildly obese, nourished, conscious, alert. Does not appear to be distress or pain at this time. SKIN:  Warm, dry, no cyanosis or jaundice. HEAD:  Normocephalic, atraumatic, no swelling or tenderness. EYES:  Pupils equal, reactive to light. EARS:  No discharge, no marked hearing loss. NOSE:  No rhinorrhea, epistaxis or septal deformity. THROAT:  Not congested. No ulceration bleeding or discharge. NECK:  No stiffness, trachea central.  No palpable masses or L.N.                 CHEST:  Symmetrical and equal on expansion. HEART:  RRR S1 > S2. No audible murmurs or gallops. LUNGS:  Equal on expansion, normal breath sounds. No adventitious sounds. ABDOMEN:  Obese. Soft on palpation. No localized tenderness. No guarding or rigidity. No palpable organomegaly. LYMPHATICS:  No palpable cervical lymphadenopathy. LOCOMOTOR, BACK AND SPINE:  No tenderness or deformities. EXTREMITIES:  Symmetrical, no pedal edema. Marianelas sign negative. No discoloration or ulcerations. NEUROLOGIC:  The patient is conscious, alert, oriented, No apparent focal sensory or motor deficits. PROVISIONAL DIAGNOSES / SURGERY:       Skin ulcer of left great toe with fat layer exposed (HCC)      Skin ulcer of second toe of right foot with fat layer exposed (HCC)      Hammer toes of both feet      Pain in toe of left foot      Pain in toe of right foot     TENOTOMY RIGHT 1ST 3RD 4TH & 5TH TOE & LEFT 3RD 4TH 5TH TOE-Bilateral        There are no active problems to display for this patient.           VIK GRIMES, MAURICIO - CNP on 7/9/2018 at 4:09 PM

## 2018-07-10 ENCOUNTER — ANESTHESIA EVENT (OUTPATIENT)
Dept: OPERATING ROOM | Age: 63
End: 2018-07-10
Payer: COMMERCIAL

## 2018-07-18 ENCOUNTER — ANESTHESIA (OUTPATIENT)
Dept: OPERATING ROOM | Age: 63
End: 2018-07-18
Payer: COMMERCIAL

## 2018-07-18 ENCOUNTER — HOSPITAL ENCOUNTER (OUTPATIENT)
Age: 63
Setting detail: OUTPATIENT SURGERY
Discharge: HOME | End: 2018-07-18
Attending: PODIATRIST | Admitting: PODIATRIST
Payer: COMMERCIAL

## 2018-07-18 VITALS
HEIGHT: 72 IN | HEART RATE: 42 BPM | RESPIRATION RATE: 16 BRPM | BODY MASS INDEX: 31.83 KG/M2 | WEIGHT: 235 LBS | OXYGEN SATURATION: 97 % | DIASTOLIC BLOOD PRESSURE: 62 MMHG | TEMPERATURE: 97.3 F | SYSTOLIC BLOOD PRESSURE: 102 MMHG

## 2018-07-18 VITALS — OXYGEN SATURATION: 100 % | SYSTOLIC BLOOD PRESSURE: 73 MMHG | TEMPERATURE: 96.8 F | DIASTOLIC BLOOD PRESSURE: 43 MMHG

## 2018-07-18 DIAGNOSIS — Z98.890 S/P BILATERAL FOOT SURGERY: Primary | ICD-10-CM

## 2018-07-18 LAB — GLUCOSE BLD-MCNC: 92 MG/DL (ref 75–110)

## 2018-07-18 PROCEDURE — 3600000012 HC SURGERY LEVEL 2 ADDTL 15MIN: Performed by: PODIATRIST

## 2018-07-18 PROCEDURE — 2580000003 HC RX 258: Performed by: ANESTHESIOLOGY

## 2018-07-18 PROCEDURE — 7100000000 HC PACU RECOVERY - FIRST 15 MIN: Performed by: PODIATRIST

## 2018-07-18 PROCEDURE — 7100000030 HC ASPR PHASE II RECOVERY - FIRST 15 MIN: Performed by: PODIATRIST

## 2018-07-18 PROCEDURE — 6360000002 HC RX W HCPCS: Performed by: NURSE ANESTHETIST, CERTIFIED REGISTERED

## 2018-07-18 PROCEDURE — 3600000002 HC SURGERY LEVEL 2 BASE: Performed by: PODIATRIST

## 2018-07-18 PROCEDURE — 2709999900 HC NON-CHARGEABLE SUPPLY: Performed by: PODIATRIST

## 2018-07-18 PROCEDURE — 3700000001 HC ADD 15 MINUTES (ANESTHESIA): Performed by: PODIATRIST

## 2018-07-18 PROCEDURE — 7100000031 HC ASPR PHASE II RECOVERY - ADDTL 15 MIN: Performed by: PODIATRIST

## 2018-07-18 PROCEDURE — 28232 INCISION OF TOE TENDON: CPT | Performed by: PODIATRIST

## 2018-07-18 PROCEDURE — 82947 ASSAY GLUCOSE BLOOD QUANT: CPT

## 2018-07-18 PROCEDURE — 6360000002 HC RX W HCPCS: Performed by: STUDENT IN AN ORGANIZED HEALTH CARE EDUCATION/TRAINING PROGRAM

## 2018-07-18 PROCEDURE — 2500000003 HC RX 250 WO HCPCS: Performed by: PODIATRIST

## 2018-07-18 PROCEDURE — 3700000000 HC ANESTHESIA ATTENDED CARE: Performed by: PODIATRIST

## 2018-07-18 PROCEDURE — 7100000001 HC PACU RECOVERY - ADDTL 15 MIN: Performed by: PODIATRIST

## 2018-07-18 PROCEDURE — 6370000000 HC RX 637 (ALT 250 FOR IP): Performed by: ANESTHESIOLOGY

## 2018-07-18 PROCEDURE — 2500000003 HC RX 250 WO HCPCS: Performed by: NURSE ANESTHETIST, CERTIFIED REGISTERED

## 2018-07-18 RX ORDER — FENTANYL CITRATE 50 UG/ML
50 INJECTION, SOLUTION INTRAMUSCULAR; INTRAVENOUS EVERY 5 MIN PRN
Status: DISCONTINUED | OUTPATIENT
Start: 2018-07-18 | End: 2018-07-18 | Stop reason: HOSPADM

## 2018-07-18 RX ORDER — HYDRALAZINE HYDROCHLORIDE 20 MG/ML
5 INJECTION INTRAMUSCULAR; INTRAVENOUS EVERY 10 MIN PRN
Status: DISCONTINUED | OUTPATIENT
Start: 2018-07-18 | End: 2018-07-18 | Stop reason: HOSPADM

## 2018-07-18 RX ORDER — PROPOFOL 10 MG/ML
INJECTION, EMULSION INTRAVENOUS CONTINUOUS PRN
Status: DISCONTINUED | OUTPATIENT
Start: 2018-07-18 | End: 2018-07-18 | Stop reason: SDUPTHER

## 2018-07-18 RX ORDER — DIPHENHYDRAMINE HYDROCHLORIDE 50 MG/ML
12.5 INJECTION INTRAMUSCULAR; INTRAVENOUS
Status: DISCONTINUED | OUTPATIENT
Start: 2018-07-18 | End: 2018-07-18 | Stop reason: HOSPADM

## 2018-07-18 RX ORDER — HYDROCODONE BITARTRATE AND ACETAMINOPHEN 5; 325 MG/1; MG/1
2 TABLET ORAL PRN
Status: DISCONTINUED | OUTPATIENT
Start: 2018-07-18 | End: 2018-07-18 | Stop reason: HOSPADM

## 2018-07-18 RX ORDER — CEFAZOLIN SODIUM 1 G/3ML
INJECTION, POWDER, FOR SOLUTION INTRAMUSCULAR; INTRAVENOUS
Status: DISCONTINUED
Start: 2018-07-18 | End: 2018-07-18 | Stop reason: HOSPADM

## 2018-07-18 RX ORDER — SCOLOPAMINE TRANSDERMAL SYSTEM 1 MG/1
1 PATCH, EXTENDED RELEASE TRANSDERMAL
Status: DISCONTINUED | OUTPATIENT
Start: 2018-07-18 | End: 2018-07-18 | Stop reason: HOSPADM

## 2018-07-18 RX ORDER — FENTANYL CITRATE 50 UG/ML
25 INJECTION, SOLUTION INTRAMUSCULAR; INTRAVENOUS EVERY 5 MIN PRN
Status: DISCONTINUED | OUTPATIENT
Start: 2018-07-18 | End: 2018-07-18 | Stop reason: HOSPADM

## 2018-07-18 RX ORDER — MIDAZOLAM HYDROCHLORIDE 1 MG/ML
INJECTION INTRAMUSCULAR; INTRAVENOUS PRN
Status: DISCONTINUED | OUTPATIENT
Start: 2018-07-18 | End: 2018-07-18 | Stop reason: SDUPTHER

## 2018-07-18 RX ORDER — MORPHINE SULFATE 2 MG/ML
2 INJECTION, SOLUTION INTRAMUSCULAR; INTRAVENOUS EVERY 5 MIN PRN
Status: DISCONTINUED | OUTPATIENT
Start: 2018-07-18 | End: 2018-07-18 | Stop reason: HOSPADM

## 2018-07-18 RX ORDER — EPHEDRINE SULFATE 50 MG/ML
INJECTION, SOLUTION INTRAVENOUS PRN
Status: DISCONTINUED | OUTPATIENT
Start: 2018-07-18 | End: 2018-07-18 | Stop reason: SDUPTHER

## 2018-07-18 RX ORDER — ONDANSETRON 2 MG/ML
INJECTION INTRAMUSCULAR; INTRAVENOUS PRN
Status: DISCONTINUED | OUTPATIENT
Start: 2018-07-18 | End: 2018-07-18 | Stop reason: SDUPTHER

## 2018-07-18 RX ORDER — HYDROCODONE BITARTRATE AND ACETAMINOPHEN 5; 325 MG/1; MG/1
1 TABLET ORAL PRN
Status: DISCONTINUED | OUTPATIENT
Start: 2018-07-18 | End: 2018-07-18 | Stop reason: HOSPADM

## 2018-07-18 RX ORDER — LABETALOL HYDROCHLORIDE 5 MG/ML
5 INJECTION, SOLUTION INTRAVENOUS EVERY 10 MIN PRN
Status: DISCONTINUED | OUTPATIENT
Start: 2018-07-18 | End: 2018-07-18 | Stop reason: HOSPADM

## 2018-07-18 RX ORDER — SODIUM CHLORIDE 0.9 % (FLUSH) 0.9 %
10 SYRINGE (ML) INJECTION PRN
Status: DISCONTINUED | OUTPATIENT
Start: 2018-07-18 | End: 2018-07-18 | Stop reason: HOSPADM

## 2018-07-18 RX ORDER — LIDOCAINE HYDROCHLORIDE 10 MG/ML
1 INJECTION, SOLUTION EPIDURAL; INFILTRATION; INTRACAUDAL; PERINEURAL
Status: DISCONTINUED | OUTPATIENT
Start: 2018-07-18 | End: 2018-07-18 | Stop reason: HOSPADM

## 2018-07-18 RX ORDER — SODIUM CHLORIDE 0.9 % (FLUSH) 0.9 %
10 SYRINGE (ML) INJECTION EVERY 12 HOURS SCHEDULED
Status: DISCONTINUED | OUTPATIENT
Start: 2018-07-18 | End: 2018-07-18 | Stop reason: HOSPADM

## 2018-07-18 RX ORDER — BUPIVACAINE HYDROCHLORIDE 5 MG/ML
INJECTION, SOLUTION EPIDURAL; INTRACAUDAL PRN
Status: DISCONTINUED | OUTPATIENT
Start: 2018-07-18 | End: 2018-07-18 | Stop reason: HOSPADM

## 2018-07-18 RX ORDER — DOXYCYCLINE HYCLATE 100 MG
100 TABLET ORAL DAILY
Qty: 10 TABLET | Refills: 0 | Status: SHIPPED | OUTPATIENT
Start: 2018-07-18 | End: 2018-07-28

## 2018-07-18 RX ORDER — SODIUM CHLORIDE, SODIUM LACTATE, POTASSIUM CHLORIDE, CALCIUM CHLORIDE 600; 310; 30; 20 MG/100ML; MG/100ML; MG/100ML; MG/100ML
INJECTION, SOLUTION INTRAVENOUS CONTINUOUS
Status: DISCONTINUED | OUTPATIENT
Start: 2018-07-18 | End: 2018-07-18 | Stop reason: HOSPADM

## 2018-07-18 RX ORDER — PROPOFOL 10 MG/ML
INJECTION, EMULSION INTRAVENOUS PRN
Status: DISCONTINUED | OUTPATIENT
Start: 2018-07-18 | End: 2018-07-18 | Stop reason: SDUPTHER

## 2018-07-18 RX ORDER — ONDANSETRON 2 MG/ML
4 INJECTION INTRAMUSCULAR; INTRAVENOUS
Status: DISCONTINUED | OUTPATIENT
Start: 2018-07-18 | End: 2018-07-18 | Stop reason: HOSPADM

## 2018-07-18 RX ORDER — MEPERIDINE HYDROCHLORIDE 50 MG/ML
12.5 INJECTION INTRAMUSCULAR; INTRAVENOUS; SUBCUTANEOUS EVERY 5 MIN PRN
Status: DISCONTINUED | OUTPATIENT
Start: 2018-07-18 | End: 2018-07-18 | Stop reason: HOSPADM

## 2018-07-18 RX ORDER — FENTANYL CITRATE 50 UG/ML
INJECTION, SOLUTION INTRAMUSCULAR; INTRAVENOUS PRN
Status: DISCONTINUED | OUTPATIENT
Start: 2018-07-18 | End: 2018-07-18 | Stop reason: SDUPTHER

## 2018-07-18 RX ADMIN — Medication 2 G: at 09:07

## 2018-07-18 RX ADMIN — ONDANSETRON 4 MG: 2 INJECTION INTRAMUSCULAR; INTRAVENOUS at 09:33

## 2018-07-18 RX ADMIN — FENTANYL CITRATE 50 MCG: 50 INJECTION, SOLUTION INTRAMUSCULAR; INTRAVENOUS at 09:07

## 2018-07-18 RX ADMIN — SODIUM CHLORIDE, POTASSIUM CHLORIDE, SODIUM LACTATE AND CALCIUM CHLORIDE: 600; 310; 30; 20 INJECTION, SOLUTION INTRAVENOUS at 07:55

## 2018-07-18 RX ADMIN — FENTANYL CITRATE 50 MCG: 50 INJECTION, SOLUTION INTRAMUSCULAR; INTRAVENOUS at 09:11

## 2018-07-18 RX ADMIN — EPHEDRINE SULFATE 10 MG: 50 INJECTION INTRAMUSCULAR; INTRAVENOUS; SUBCUTANEOUS at 09:25

## 2018-07-18 RX ADMIN — EPHEDRINE SULFATE 10 MG: 50 INJECTION INTRAMUSCULAR; INTRAVENOUS; SUBCUTANEOUS at 09:26

## 2018-07-18 RX ADMIN — PROPOFOL 30 MG: 10 INJECTION, EMULSION INTRAVENOUS at 09:11

## 2018-07-18 RX ADMIN — PROPOFOL 30 MG: 10 INJECTION, EMULSION INTRAVENOUS at 09:13

## 2018-07-18 RX ADMIN — EPHEDRINE SULFATE 10 MG: 50 INJECTION INTRAMUSCULAR; INTRAVENOUS; SUBCUTANEOUS at 09:35

## 2018-07-18 RX ADMIN — EPHEDRINE SULFATE 10 MG: 50 INJECTION INTRAMUSCULAR; INTRAVENOUS; SUBCUTANEOUS at 09:27

## 2018-07-18 RX ADMIN — MIDAZOLAM 2 MG: 1 INJECTION INTRAMUSCULAR; INTRAVENOUS at 09:07

## 2018-07-18 RX ADMIN — PROPOFOL 100 MCG/KG/MIN: 10 INJECTION, EMULSION INTRAVENOUS at 09:11

## 2018-07-18 RX ADMIN — EPHEDRINE SULFATE 10 MG: 50 INJECTION INTRAMUSCULAR; INTRAVENOUS; SUBCUTANEOUS at 09:29

## 2018-07-18 ASSESSMENT — PULMONARY FUNCTION TESTS
PIF_VALUE: 0
PIF_VALUE: 1
PIF_VALUE: 2
PIF_VALUE: 1
PIF_VALUE: 0
PIF_VALUE: 1

## 2018-07-18 ASSESSMENT — PAIN SCALES - GENERAL
PAINLEVEL_OUTOF10: 0

## 2018-07-18 ASSESSMENT — PAIN - FUNCTIONAL ASSESSMENT: PAIN_FUNCTIONAL_ASSESSMENT: 0-10

## 2018-07-18 NOTE — INTERVAL H&P NOTE
HISTORY and [facility] [address]       NAME:  Candi Louis  MRN: [id number]   YOB: 1955   Date: 7/18/2018   Age: 57 y.o. Gender: male     H&P Update Note    H&P from 07/09/2018  reviewed and updated, Patient examined. Vitals: /72   Pulse 51   Temp 97 °F (36.1 °C) (Oral)   Resp 18   Ht 6' (1.829 m)   Wt 235 lb (106.6 kg)   SpO2 99%   BMI 31.87 kg/m²  Body mass index is 31.87 kg/m². No interval changes. I concur with the findings.        ARLENE MCGILL PA-C on 7/18/2018 at 8:03 AM

## 2018-07-18 NOTE — BRIEF OP NOTE
PODIATRY BRIEF OP NOTE    PATIENT NAME: Tri Gomez  YOB: 1955  -  57 y.o. male  MRN: [id number]  DATE: 7/18/2018  BILLING #: [id number]    Surgeon(s):  Donte Do DPM     ASSISTANTS: Mariano Coleman DPM    PRE-OP DIAGNOSIS:   1. Hammertoe deformity hallux, right foot  2. Hammertoe deformity 3rd digit, right foot  3. Hammertoe deformity 4th digit, right foot  4. Hammertoe deformity 5th digit, right foot  5. Hammertoe deformity, 3rd digit, left foot  6. Hammertoe deformity, 4th digit, left foot  7.  Hammertoe deformity 5th digit, left foot    POST-OP DIAGNOSIS: Same as above    PROCEDURE:   1) Flexor tendon tenotomy, hallux, right foot  2) Flexor tendon tenotomy, 3rd digit, right foot  3) Flexor tendon tenotomy, 4th digit, right foot  4) Flexor tendon tenotomy, 5th digit, right foot  5) Flexor tendon tenotomy, 3rd digit, left foot  6) Flexor tendon tenotomy, 4th digit, left foot  7) Flexor tendon tenotomy, 5th digit, left foot    ANESTHESIA: MAC with local injection of 10 cc of 0.5% marcaine plain per foot, total of 20 cc    HEMOSTASIS: Direct pressure, no tourniquet    ESTIMATED BLOOD LOSS: Less than 10 cc    MATERIALS: None  * No implants in log *    INJECTABLES: None    SPECIMEN: None  * No specimens in log *    COMPLICATIONS: None    FINDINGS: Flexor tendon contracture deformities released with tenotomies    Mariano Coleman DPM  7/18/18, 11:41 AM

## 2018-07-18 NOTE — H&P (VIEW-ONLY)
HISTORY and [facility]       NAME:  Yulia Castellon  MRN: [id number]   YOB: 1955   Date: 7/9/2018   Age: 57 y.o. Gender: male       COMPLAINT AND PRESENT HISTORY:     Yulia Castellon is 57 y.o.,  male for [address] TOE & LEFT 3RD 4TH 5TH TOE-Bilateral.  He has a history of pain and left and right foot, skin ulcers of the great toe with fat layer exposed, and skin ulcers of the second toe of right foot. The symptoms started years ago, and seemed to worsen recently around April of this year. She complains of associated neuropathy to his toes that could be secondary to his low back pain as well. Patient reports having his tendons cut in the past to help release the pain from his toes curling in tightly. No recent falls or trauma. No fever or chills. PAST MEDICAL HISTORY  Pt denies any other symptoms. Past Medical History:   Diagnosis Date    Depression     Hypertension     Kidney disease     Osteoarthritis     PONV (postoperative nausea and vomiting)     Wears glasses      Pt denies any history of Diabetes mellitus type 2,  stroke, heart disease, COPD, Asthma, GERD, HLD, Cancer, Seizures,Thyroid disease,  Hepatitis, TB, or Substance abuse. SURGICAL HISTORY       Past Surgical History:   Procedure Laterality Date    ANKLE SURGERY Left     CYST REMOVAL      Neck     KNEE ARTHROSCOPY Bilateral     several    SHOULDER SURGERY Bilateral     TOTAL KNEE ARTHROPLASTY Bilateral        FAMILY HISTORY     History reviewed. No pertinent family history.     SOCIAL HISTORY       Social History     Social History    Marital status:      Spouse name: N/A    Number of children: N/A    Years of education: N/A     Social History Main Topics    Smoking status: Never Smoker    Smokeless tobacco: Never Used    Alcohol use Yes      Comment: rare    Drug use: No    Sexual activity: Not Asked     Other Topics Concern    None Social History Narrative    None           REVIEW OF SYSTEMS      Allergies   Allergen Reactions    Asa [Aspirin] Hives    Nsaids      Hard on kidneys    Reglan [Metoclopramide] Itching       Current Outpatient Prescriptions on File Prior to Encounter   Medication Sig Dispense Refill    allopurinol (ZYLOPRIM) 300 MG tablet Take 300 mg by mouth daily      bisoprolol-hydrochlorothiazide (ZIAC) 10-6.25 MG per tablet Take 1 tablet by mouth daily      lisinopril (PRINIVIL;ZESTRIL) 10 MG tablet Take 10 mg by mouth daily      DULoxetine (CYMBALTA) 60 MG extended release capsule Take 60 mg by mouth 2 times daily       pregabalin (LYRICA) 300 MG capsule Take 300 mg by mouth 2 times daily. Tasha Goes Morphine Sulfate ER 15 MG TBEA Take by mouth daily. Tasha Goes oxyCODONE HCl (OXY-IR) 10 MG immediate release tablet Take 10 mg by mouth every 6 hours as needed for Pain. Tasha Goes diazepam (VALIUM) 2 MG tablet Take 2 mg by mouth every 6 hours as needed for Anxiety. .      polyethylene glycol (GLYCOLAX) powder Take 17 g by mouth daily       No current facility-administered medications on file prior to encounter. General health:  Fairly good. No fever or chills. Skin:  No itching, redness or rash. HEENT:  No headache, epistaxis or sore throat. Neck:  No pain, stiffness or masses. Cardiovascular/Respiratory system:  No chest pain, palpitation or shortness of breath. Gastrointestinal tract: No abdominal pain, nausea, vomiting, diarrhea or constipation. Genitourinary:  No burning on micturition. No hesitancy, urgency, frequency or discoloration of urine. Locomotor:  See HPI. Neuropsychiatric:  No referable complaints.                       GENERAL PHYSICAL EXAM:     Vitals: /85   Pulse 50   Temp 98 °F (36.7 °C)   Resp 20   Ht 6' (1.829 m)   Wt 235 lb (106.6 kg)   SpO2 99%   BMI 31.87 kg/m²  Body mass index is 31.87 kg/m². GENERAL APPEARANCE:   Lowella Boys is 57 y.o.,  male, mildly obese, nourished, conscious, alert. Does not appear to be distress or pain at this time. SKIN:  Warm, dry, no cyanosis or jaundice. HEAD:  Normocephalic, atraumatic, no swelling or tenderness. EYES:  Pupils equal, reactive to light. EARS:  No discharge, no marked hearing loss. NOSE:  No rhinorrhea, epistaxis or septal deformity. THROAT:  Not congested. No ulceration bleeding or discharge. NECK:  No stiffness, trachea central.  No palpable masses or L.N.                 CHEST:  Symmetrical and equal on expansion. HEART:  RRR S1 > S2. No audible murmurs or gallops. LUNGS:  Equal on expansion, normal breath sounds. No adventitious sounds. ABDOMEN:  Obese. Soft on palpation. No localized tenderness. No guarding or rigidity. No palpable organomegaly. LYMPHATICS:  No palpable cervical lymphadenopathy. LOCOMOTOR, BACK AND SPINE:  No tenderness or deformities. EXTREMITIES:  Symmetrical, no pedal edema. Marianelas sign negative. No discoloration or ulcerations. NEUROLOGIC:  The patient is conscious, alert, oriented, No apparent focal sensory or motor deficits. PROVISIONAL DIAGNOSES / SURGERY:       Skin ulcer of left great toe with fat layer exposed (HCC)      Skin ulcer of second toe of right foot with fat layer exposed (HCC)      Hammer toes of both feet      Pain in toe of left foot      Pain in toe of right foot     TENOTOMY RIGHT 1ST 3RD 4TH & 5TH TOE & LEFT 3RD 4TH 5TH TOE-Bilateral        There are no active problems to display for this patient.           VIK GRIMES, MAURICIO - CNP on 7/9/2018 at 4:09 PM

## 2018-07-23 ENCOUNTER — OFFICE VISIT (OUTPATIENT)
Dept: PODIATRY | Age: 63
End: 2018-07-23

## 2018-07-23 VITALS
HEART RATE: 53 BPM | HEIGHT: 72 IN | WEIGHT: 230 LBS | DIASTOLIC BLOOD PRESSURE: 83 MMHG | BODY MASS INDEX: 31.15 KG/M2 | SYSTOLIC BLOOD PRESSURE: 141 MMHG

## 2018-07-23 DIAGNOSIS — M79.674 PAIN IN TOE OF RIGHT FOOT: ICD-10-CM

## 2018-07-23 DIAGNOSIS — M79.675 PAIN IN TOE OF LEFT FOOT: ICD-10-CM

## 2018-07-23 DIAGNOSIS — M20.41 HAMMER TOES OF BOTH FEET: Primary | ICD-10-CM

## 2018-07-23 DIAGNOSIS — M20.42 HAMMER TOES OF BOTH FEET: Primary | ICD-10-CM

## 2018-07-23 PROCEDURE — 99024 POSTOP FOLLOW-UP VISIT: CPT | Performed by: PODIATRIST

## 2018-07-23 ASSESSMENT — ENCOUNTER SYMPTOMS
NAUSEA: 0
BACK PAIN: 0
DIARRHEA: 0
SHORTNESS OF BREATH: 0
COLOR CHANGE: 0

## 2018-07-23 NOTE — PROGRESS NOTES
Subjective: Patient presents to the office today status-post tenotomy to toes 1, 3-5 of the right foot and 3-5 of the left foot. Patient states they have kept the dressing to the bilateral lower extremity clean, dry, and intact since the surgery. Patient states that he has worn his post op shoes on both feet as instructed since the surgery. Patient states that their pain has been well-controlled with the prescribed pain medication. Patient states that they have been taking their oral antibiotics as prescribed. Patient denies any fever, chills, nausea, vomiting, or night sweats. Review of Systems   Constitutional: Negative for activity change, appetite change, chills, diaphoresis, fatigue and fever. Respiratory: Negative for shortness of breath. Cardiovascular: Negative for leg swelling. Gastrointestinal: Negative for diarrhea and nausea. Endocrine: Negative for cold intolerance, heat intolerance and polyuria. Musculoskeletal: Positive for arthralgias. Negative for back pain, gait problem, joint swelling and myalgias. Skin: Positive for wound. Negative for color change, pallor and rash. Allergic/Immunologic: Negative for environmental allergies and food allergies. Neurological: Negative for dizziness, weakness, light-headedness and numbness. Hematological: Does not bruise/bleed easily. Psychiatric/Behavioral: Negative for behavioral problems, confusion and self-injury. The patient is not nervous/anxious. Objective: Clinical evaluation of the patient reveals dressing to the bilateral lower extremity to be clean, dry, and intact. Removal of the dressing reveals some maceration to the interdigital spaces of both feet. The wounds to each toe are well coapted. There is erythema surrounding the incision sites, but it does not extend beyond this area. There is no calor, drainage, or malodor noted to the surgical site. There is moderate non-pitting edema present to the bilateral lower extremity.

## 2018-08-03 ENCOUNTER — OFFICE VISIT (OUTPATIENT)
Dept: PODIATRY | Age: 63
End: 2018-08-03

## 2018-08-03 VITALS
HEIGHT: 72 IN | HEART RATE: 74 BPM | WEIGHT: 230 LBS | BODY MASS INDEX: 31.15 KG/M2 | SYSTOLIC BLOOD PRESSURE: 123 MMHG | DIASTOLIC BLOOD PRESSURE: 72 MMHG

## 2018-08-03 DIAGNOSIS — M20.41 HAMMER TOES OF BOTH FEET: Primary | ICD-10-CM

## 2018-08-03 DIAGNOSIS — M20.42 HAMMER TOES OF BOTH FEET: Primary | ICD-10-CM

## 2018-08-03 DIAGNOSIS — M79.675 PAIN IN TOE OF LEFT FOOT: ICD-10-CM

## 2018-08-03 DIAGNOSIS — M79.674 PAIN IN TOE OF RIGHT FOOT: ICD-10-CM

## 2018-08-03 PROCEDURE — 99024 POSTOP FOLLOW-UP VISIT: CPT | Performed by: PODIATRIST

## 2018-08-08 ASSESSMENT — ENCOUNTER SYMPTOMS
COLOR CHANGE: 0
SHORTNESS OF BREATH: 0
DIARRHEA: 0
NAUSEA: 0
BACK PAIN: 0

## 2018-08-09 NOTE — PROGRESS NOTES
Subjective: Moreno Huerta 57 y.o. male that presents for follow up evaluation of tenotomy of toes 1, 3-5 of the right foot and toes 3-5 of the left foot as well as wounds to the left hallux and right second toe. Chief Complaint   Patient presents with    Post-Op Check     post op tenotomy left hallux/wound care    Patient's treatment thus far has included keeping dressings to both feet clean, dry, and intact and wearing surgical shoes. Pain is rated 2 out of 10 and is described as intermittent. Patient has been following my prescribed course of therapy as instructed. Review of Systems   Constitutional: Negative for activity change, appetite change, chills, diaphoresis, fatigue and fever. Respiratory: Negative for shortness of breath. Cardiovascular: Negative for leg swelling. Gastrointestinal: Negative for diarrhea and nausea. Endocrine: Negative for cold intolerance, heat intolerance and polyuria. Musculoskeletal: Positive for arthralgias. Negative for back pain, gait problem, joint swelling and myalgias. Skin: Positive for wound. Negative for color change, pallor and rash. Allergic/Immunologic: Negative for environmental allergies and food allergies. Neurological: Negative for dizziness, weakness, light-headedness and numbness. Hematological: Does not bruise/bleed easily. Psychiatric/Behavioral: Negative for behavioral problems, confusion and self-injury. The patient is not nervous/anxious. Objective: Clinical evaluation of the patient reveals dressings to the bilateral feet are clean, dry, and intact. Removal of the dressings reveals no maceration today. The tenotomy wounds are healed at all sites. There is only minimal erythema present to the toes of the bilateral feet. There is no calor, drainage, or malodor noted to either foot. The wounds to the left hallux and right second toe remain present, but they appear epithelialized. Assessment:    Diagnosis Orders   1.

## 2018-08-16 ENCOUNTER — OFFICE VISIT (OUTPATIENT)
Dept: PODIATRY | Age: 63
End: 2018-08-16
Payer: COMMERCIAL

## 2018-08-16 VITALS
WEIGHT: 230 LBS | HEIGHT: 72 IN | HEART RATE: 58 BPM | BODY MASS INDEX: 31.15 KG/M2 | SYSTOLIC BLOOD PRESSURE: 127 MMHG | DIASTOLIC BLOOD PRESSURE: 71 MMHG

## 2018-08-16 DIAGNOSIS — L84 CORNS AND CALLOSITIES: ICD-10-CM

## 2018-08-16 DIAGNOSIS — L97.512 SKIN ULCER OF SECOND TOE OF RIGHT FOOT WITH FAT LAYER EXPOSED (HCC): ICD-10-CM

## 2018-08-16 DIAGNOSIS — M20.41 HAMMER TOES OF BOTH FEET: ICD-10-CM

## 2018-08-16 DIAGNOSIS — L97.522 SKIN ULCER OF LEFT GREAT TOE WITH FAT LAYER EXPOSED (HCC): Primary | ICD-10-CM

## 2018-08-16 DIAGNOSIS — M79.674 PAIN IN TOE OF RIGHT FOOT: ICD-10-CM

## 2018-08-16 DIAGNOSIS — M20.42 HAMMER TOES OF BOTH FEET: ICD-10-CM

## 2018-08-16 PROCEDURE — 99024 POSTOP FOLLOW-UP VISIT: CPT | Performed by: PODIATRIST

## 2018-08-16 PROCEDURE — 1101F PT FALLS ASSESS-DOCD LE1/YR: CPT | Performed by: PODIATRIST

## 2018-08-16 PROCEDURE — 1036F TOBACCO NON-USER: CPT | Performed by: PODIATRIST

## 2018-08-16 PROCEDURE — 4040F PNEUMOC VAC/ADMIN/RCVD: CPT | Performed by: PODIATRIST

## 2018-08-16 PROCEDURE — 3017F COLORECTAL CA SCREEN DOC REV: CPT | Performed by: PODIATRIST

## 2018-08-16 PROCEDURE — G8417 CALC BMI ABV UP PARAM F/U: HCPCS | Performed by: PODIATRIST

## 2018-08-16 PROCEDURE — 1123F ACP DISCUSS/DSCN MKR DOCD: CPT | Performed by: PODIATRIST

## 2018-08-16 PROCEDURE — G8427 DOCREV CUR MEDS BY ELIG CLIN: HCPCS | Performed by: PODIATRIST

## 2018-08-16 PROCEDURE — 11055 PARING/CUTG B9 HYPRKER LES 1: CPT | Performed by: PODIATRIST

## 2018-08-16 ASSESSMENT — ENCOUNTER SYMPTOMS
BACK PAIN: 0
SHORTNESS OF BREATH: 0
COLOR CHANGE: 0
DIARRHEA: 0
NAUSEA: 0

## 2018-08-16 NOTE — PROGRESS NOTES
Subjective: Michelet Martinez 70 y.o. male that presents for follow up evaluation of wounds to the right second toe and the left big toe. Chief Complaint   Patient presents with    Post-Op Check     B/L feet, doing good no concerns. He has been able to wear tennis shoes with no problems    Patient's treatment thus far has included Daily dressing changes with antibiotic ointment and Band-Aids. Pain is rated 0 out of 10 and is described as none. Patient has been following my prescribed course of therapy as instructed. Patient states that he is back to normal shoewear and has not had to change the dressings for the last few days. Review of Systems   Constitutional: Negative for activity change, appetite change, chills, diaphoresis, fatigue and fever. Respiratory: Negative for shortness of breath. Cardiovascular: Negative for leg swelling. Gastrointestinal: Negative for diarrhea and nausea. Endocrine: Negative for cold intolerance, heat intolerance and polyuria. Musculoskeletal: Positive for arthralgias. Negative for back pain, gait problem, joint swelling and myalgias. Skin: Negative for color change, pallor, rash and wound. Allergic/Immunologic: Negative for environmental allergies and food allergies. Neurological: Negative for dizziness, weakness, light-headedness and numbness. Hematological: Does not bruise/bleed easily. Psychiatric/Behavioral: Negative for behavioral problems, confusion and self-injury. The patient is not nervous/anxious. Objective: Clinical evaluation of the patient reveals eschar formation noted to the plantar aspect of the left hallux and the distal aspect of the right second toe. The eschar to both areas is stable. There is no fluctuance noted. There is no drainage or malodor noted. There is no erythema or calor noted to either of these toes. There is thickened callus formation present to the plantar medial aspect of the right hallux.   There is mild pain

## 2019-09-27 ENCOUNTER — OFFICE VISIT (OUTPATIENT)
Dept: NEUROLOGY | Age: 64
End: 2019-09-27
Payer: COMMERCIAL

## 2019-09-27 ENCOUNTER — HOSPITAL ENCOUNTER (OUTPATIENT)
Age: 64
Setting detail: SPECIMEN
Discharge: HOME | End: 2019-09-27
Payer: MEDICARE

## 2019-09-27 VITALS
SYSTOLIC BLOOD PRESSURE: 144 MMHG | BODY MASS INDEX: 33.67 KG/M2 | WEIGHT: 248.6 LBS | HEIGHT: 72 IN | DIASTOLIC BLOOD PRESSURE: 82 MMHG | HEART RATE: 64 BPM

## 2019-09-27 DIAGNOSIS — M54.50 BACK PAIN AT L4-L5 LEVEL: Primary | ICD-10-CM

## 2019-09-27 DIAGNOSIS — G62.9 PERIPHERAL POLYNEUROPATHY: ICD-10-CM

## 2019-09-27 DIAGNOSIS — F32.A DEPRESSION, UNSPECIFIED DEPRESSION TYPE: ICD-10-CM

## 2019-09-27 DIAGNOSIS — R29.90 IMPAIRED JOINT POSITION SENSE: ICD-10-CM

## 2019-09-27 LAB
FOLATE: >20 NG/ML
VITAMIN B-12: 633 PG/ML (ref 232–1245)

## 2019-09-27 PROCEDURE — G8427 DOCREV CUR MEDS BY ELIG CLIN: HCPCS | Performed by: PSYCHIATRY & NEUROLOGY

## 2019-09-27 PROCEDURE — G8417 CALC BMI ABV UP PARAM F/U: HCPCS | Performed by: PSYCHIATRY & NEUROLOGY

## 2019-09-27 PROCEDURE — 4040F PNEUMOC VAC/ADMIN/RCVD: CPT | Performed by: PSYCHIATRY & NEUROLOGY

## 2019-09-27 PROCEDURE — 99205 OFFICE O/P NEW HI 60 MIN: CPT | Performed by: PSYCHIATRY & NEUROLOGY

## 2019-09-27 PROCEDURE — 1123F ACP DISCUSS/DSCN MKR DOCD: CPT | Performed by: PSYCHIATRY & NEUROLOGY

## 2019-09-27 PROCEDURE — 3017F COLORECTAL CA SCREEN DOC REV: CPT | Performed by: PSYCHIATRY & NEUROLOGY

## 2019-09-27 PROCEDURE — 1036F TOBACCO NON-USER: CPT | Performed by: PSYCHIATRY & NEUROLOGY

## 2019-09-27 RX ORDER — LAMOTRIGINE 25 MG/1
TABLET ORAL
Qty: 120 TABLET | Refills: 3 | Status: SHIPPED | OUTPATIENT
Start: 2019-09-27 | End: 2019-11-08

## 2019-09-27 RX ORDER — LIDOCAINE 50 MG/G
OINTMENT TOPICAL
Qty: 50 G | Refills: 5 | Status: SHIPPED | OUTPATIENT
Start: 2019-09-27 | End: 2019-11-22 | Stop reason: ALTCHOICE

## 2019-09-27 RX ORDER — TAMSULOSIN HYDROCHLORIDE 0.4 MG/1
0.4 CAPSULE ORAL EVERY 12 HOURS
COMMUNITY

## 2019-09-27 NOTE — PROGRESS NOTES
[facility]            [facility], [address]; [facility], [address] [facility]    [address] [facility], [address], [place], [address] [facility]            Dept: [phone]          Dept Fax: [phone]            MD Aldo Cam MD Ahmed B. Simmie Moellers, MD Hannah Reels, MD Peg Roof, MD Gladis Greet, CNP                          NEUROLOGY NEW PATIENT NOTE       PATIENT NAME: Raven Ortiz II  PATIENT MRN: [id number]  PRIMARY CARE PHYSICIAN: Dr. Jose Daniel Barrera MD  REFERRED BY: Dr. Joseluis Love     Dear Dr. Joseluis Love    I had the pleasure of seeing your patient Raven Ortiz II, who comes to establish care with us. CHIEF COMPLAINTS: New Patient and Peripheral Neuropathy       HPI:    Becky Mcdaniels is a 75year old RH WM who was referred to neurology for neuropathy. Neuropathy  Features: Diagnosed in 2011 or 2012, at that time, numbing/tingling in both foot bottom, progressively got worse through these years. Both foot numbness, tingling and intermittent burning pain, on fire, worse at night, 3-4/10. He also chronic back pain since 2011 with radiation to right leg, after back surgery, his pain radiation to leg resolved, but since this summer, he again has pain from back, travel to hip, then to right leg medially above knee. Touching skin of his thigh very uncomfortable, painful. No muscle spasm. He has been on oxycodone for years. When standing in dark or with eyes closed, he does not feel steady. Risk factor: he lifted heavy weight \"in whole life\", he fell several times including one time on ice, slipped, his back pain started then, only in bed, not in legs.  S/p fusion surgery in 8/2018  Social: , former smoking for 20 years, social drinking, on oxycodone since 2005 until 1.5 years ago, he started to taper down, currently 2 pills a day (one bedtime, one mid of rate and rhythm, normal S1, S2. No murmurs, clicks or gallops. Peripheral pulses: radial pulses palpable  Abdominal: BS present, soft, NT, ND  Extremities: no edema    NEUROLOGICAL EXAMINATION:     MS: awake, alert and oriented. No aphasia, dysarthria, or neglect  CNs: PERRLA, EOMI, VF full, sensation intact, face symmetric, hearing intact, soft palate rises on phonation, sternocleidomastoid and trapezius intact. Tongue midline, no fasciculations. Motor: no abnormal movements, tone and bulk okay. RUE: delta 5/5, biceps 5/5, triceps 5/5,  5/5  LUE: delta 5/5, biceps 5/5, triceps 5/5,  5/5  RLE: hf 5/5, ke 5/5, df 5/5, pf 5/5  LLE: hf 5/5, ke 5/5, df 5/5, pf 5/5  Reflexes: 2+ in UEs, deferred knees (both side surgery in the past), symmetric, babinski not present. Coordination: FNF no dysmetria, heel to shin okay, RUPERTO okay, questionable Rhomberg. Gait: Normal straight, difficulty Tandem. Sensory: loss of sensation to pp/touch/temp below mid lower leg. Length dependent reduction below knees. Loss of joint position sense on right big toe, no extinction. 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 - check B12, folate, copper, zinc, SPEP    // Radicular pain, back pain  - back pain, radiating to right leg  - history of back surgery   - long term narcotic use, educated on slow wean  - lidocaine cream to back 4-5 times a day  - start lamictal for neuropathic pain 25mg daily with titration, side effects reviewed    // depression  - start lamictal for both pain and depression    // Loss of position sense  - check B12, copper      >50% of 60 minute face to face time spent counseling patient. Multiple issues discussed, all questions answered. RTC in 6-8 weeks      Thank you for referring Mr. Lula Atkinson II to us, shall you have any questions, please do not hesitate to let me know.  Thank

## 2019-09-30 LAB — COPPER: 115.5 UG/DL (ref 70–140)

## 2019-10-01 LAB — ZINC: 69.6 UG/DL (ref 60–120)

## 2019-10-04 ENCOUNTER — TELEPHONE (OUTPATIENT)
Dept: NEUROLOGY | Age: 64
End: 2019-10-04

## 2019-11-07 ENCOUNTER — TELEPHONE (OUTPATIENT)
Dept: NEUROLOGY | Age: 64
End: 2019-11-07

## 2019-11-07 NOTE — TELEPHONE ENCOUNTER
Karen Roberts called back he said that he is up to the 100 mg daily of the Lamotrigine. It is helping. He still has pain and wonders if it can be increased further. His next appointment is 11/22/2019. Please advise.

## 2019-11-08 DIAGNOSIS — M54.50 BACK PAIN AT L4-L5 LEVEL: ICD-10-CM

## 2019-11-08 DIAGNOSIS — G62.9 PERIPHERAL POLYNEUROPATHY: ICD-10-CM

## 2019-11-08 DIAGNOSIS — F32.A DEPRESSION, UNSPECIFIED DEPRESSION TYPE: ICD-10-CM

## 2019-11-08 RX ORDER — LAMOTRIGINE 100 MG/1
TABLET ORAL
Qty: 60 TABLET | Refills: 3 | Status: SHIPPED | OUTPATIENT
Start: 2019-11-08 | End: 2020-03-05

## 2019-11-22 ENCOUNTER — OFFICE VISIT (OUTPATIENT)
Dept: NEUROLOGY | Age: 64
End: 2019-11-22
Payer: COMMERCIAL

## 2019-11-22 VITALS
SYSTOLIC BLOOD PRESSURE: 119 MMHG | DIASTOLIC BLOOD PRESSURE: 73 MMHG | HEIGHT: 72 IN | WEIGHT: 245 LBS | HEART RATE: 67 BPM | BODY MASS INDEX: 33.18 KG/M2

## 2019-11-22 DIAGNOSIS — G62.9 PERIPHERAL POLYNEUROPATHY: Primary | ICD-10-CM

## 2019-11-22 DIAGNOSIS — F32.A DEPRESSION, UNSPECIFIED DEPRESSION TYPE: ICD-10-CM

## 2019-11-22 DIAGNOSIS — M54.10 RADICULAR PAIN: ICD-10-CM

## 2019-11-22 DIAGNOSIS — M54.50 BACK PAIN AT L4-L5 LEVEL: ICD-10-CM

## 2019-11-22 PROCEDURE — G8427 DOCREV CUR MEDS BY ELIG CLIN: HCPCS | Performed by: PSYCHIATRY & NEUROLOGY

## 2019-11-22 PROCEDURE — G8417 CALC BMI ABV UP PARAM F/U: HCPCS | Performed by: PSYCHIATRY & NEUROLOGY

## 2019-11-22 PROCEDURE — G8484 FLU IMMUNIZE NO ADMIN: HCPCS | Performed by: PSYCHIATRY & NEUROLOGY

## 2019-11-22 PROCEDURE — 3017F COLORECTAL CA SCREEN DOC REV: CPT | Performed by: PSYCHIATRY & NEUROLOGY

## 2019-11-22 PROCEDURE — 1036F TOBACCO NON-USER: CPT | Performed by: PSYCHIATRY & NEUROLOGY

## 2019-11-22 PROCEDURE — 99215 OFFICE O/P EST HI 40 MIN: CPT | Performed by: PSYCHIATRY & NEUROLOGY

## 2019-11-22 PROCEDURE — 4040F PNEUMOC VAC/ADMIN/RCVD: CPT | Performed by: PSYCHIATRY & NEUROLOGY

## 2019-11-22 PROCEDURE — 1123F ACP DISCUSS/DSCN MKR DOCD: CPT | Performed by: PSYCHIATRY & NEUROLOGY

## 2020-07-27 RX ORDER — LAMOTRIGINE 100 MG/1
TABLET ORAL
Qty: 60 TABLET | Refills: 3 | Status: SHIPPED | OUTPATIENT
Start: 2020-07-27

## (undated) DEVICE — STANDARD HYPODERMIC NEEDLE,POLYPROPYLENE HUB: Brand: MONOJECT

## (undated) DEVICE — SUTURE ETHLN SZ 4-0 L18IN NONABSORBABLE BLK L19MM PS-2 3/8 1667H

## (undated) DEVICE — BANDAGE,GAUZE,BULKEE II,4.5"X4.1YD,STRL: Brand: MEDLINE

## (undated) DEVICE — 3M™ WARMING BLANKET, UPPER BODY, 10 PER CASE, 42268: Brand: BAIR HUGGER™

## (undated) DEVICE — GOWN,SIRUS,POLYRNF,BRTHSLV,XL,30/CS: Brand: MEDLINE

## (undated) DEVICE — SPONGE LAP W18XL18IN WHT COT 4 PLY FLD STRUNG RADPQ DISP ST

## (undated) DEVICE — SINGLE PORT MANIFOLD: Brand: NEPTUNE 2

## (undated) DEVICE — GAUZE,SPONGE,FLUFF,6"X6.75",STRL,5/TRAY: Brand: MEDLINE

## (undated) DEVICE — GOWN,AURORA,NONREINFORCED,LARGE: Brand: MEDLINE

## (undated) DEVICE — GLOVE SURG SZ 85 L12IN FNGR THK13MIL BRN LTX SYN POLYMER W

## (undated) DEVICE — DRESSING PETRO W3XL3IN OIL EMUL N ADH GZ KNIT IMPREG CELOS

## (undated) DEVICE — INTENDED FOR TISSUE SEPARATION, AND OTHER PROCEDURES THAT REQUIRE A SHARP SURGICAL BLADE TO PUNCTURE OR CUT.: Brand: BARD-PARKER ® CARBON RIB-BACK BLADES

## (undated) DEVICE — DRAPE, EXTREMITY, BILATERAL, STERILE: Brand: MEDLINE

## (undated) DEVICE — Device

## (undated) DEVICE — SOLUTION IV IRRIG POUR BRL 0.9% SODIUM CHL 2F7124